# Patient Record
Sex: FEMALE | Race: WHITE
[De-identification: names, ages, dates, MRNs, and addresses within clinical notes are randomized per-mention and may not be internally consistent; named-entity substitution may affect disease eponyms.]

---

## 2018-08-30 ENCOUNTER — HOSPITAL ENCOUNTER (EMERGENCY)
Dept: HOSPITAL 88 - ER | Age: 73
Discharge: HOME | End: 2018-08-30
Payer: MEDICARE

## 2018-08-30 VITALS — SYSTOLIC BLOOD PRESSURE: 128 MMHG | DIASTOLIC BLOOD PRESSURE: 72 MMHG

## 2018-08-30 VITALS — WEIGHT: 170 LBS | HEIGHT: 67 IN | BODY MASS INDEX: 26.68 KG/M2

## 2018-08-30 DIAGNOSIS — R05: ICD-10-CM

## 2018-08-30 DIAGNOSIS — J45.41: ICD-10-CM

## 2018-08-30 DIAGNOSIS — R06.00: Primary | ICD-10-CM

## 2018-08-30 DIAGNOSIS — J98.01: ICD-10-CM

## 2018-08-30 LAB
ALBUMIN SERPL-MCNC: 4 G/DL (ref 3.5–5)
ALBUMIN/GLOB SERPL: 1.3 {RATIO} (ref 0.8–2)
ALP SERPL-CCNC: 52 IU/L (ref 40–150)
ALT SERPL-CCNC: 38 IU/L (ref 0–55)
ANION GAP SERPL CALC-SCNC: 16.2 MMOL/L (ref 8–16)
BASOPHILS # BLD AUTO: 0 10*3/UL (ref 0–0.1)
BASOPHILS NFR BLD AUTO: 0.2 % (ref 0–1)
BUN SERPL-MCNC: 16 MG/DL (ref 7–26)
BUN/CREAT SERPL: 22 (ref 6–25)
CALCIUM SERPL-MCNC: 9.7 MG/DL (ref 8.4–10.2)
CHLORIDE SERPL-SCNC: 105 MMOL/L (ref 98–107)
CK MB SERPL-MCNC: 0.7 NG/ML (ref 0–5)
CK SERPL-CCNC: 34 IU/L (ref 29–168)
CO2 SERPL-SCNC: 25 MMOL/L (ref 22–29)
DEPRECATED APTT PLAS QN: 29 SECONDS (ref 23.8–35.5)
DEPRECATED INR PLAS: 1.09
DEPRECATED NEUTROPHILS # BLD AUTO: 12.6 10*3/UL (ref 2.1–6.9)
EGFRCR SERPLBLD CKD-EPI 2021: > 60 ML/MIN (ref 60–?)
EOSINOPHIL # BLD AUTO: 0 10*3/UL (ref 0–0.4)
EOSINOPHIL NFR BLD AUTO: 0 % (ref 0–6)
ERYTHROCYTE [DISTWIDTH] IN CORD BLOOD: 13.2 % (ref 11.7–14.4)
GLOBULIN PLAS-MCNC: 3 G/DL (ref 2.3–3.5)
GLUCOSE SERPLBLD-MCNC: 121 MG/DL (ref 74–118)
HCT VFR BLD AUTO: 45.2 % (ref 34.2–44.1)
HGB BLD-MCNC: 15.4 G/DL (ref 12–16)
LYMPHOCYTES # BLD: 1.4 10*3/UL (ref 1–3.2)
LYMPHOCYTES NFR BLD AUTO: 9.6 % (ref 18–39.1)
MCH RBC QN AUTO: 30.1 PG (ref 28–32)
MCHC RBC AUTO-ENTMCNC: 34.1 G/DL (ref 31–35)
MCV RBC AUTO: 88.3 FL (ref 81–99)
MONOCYTES # BLD AUTO: 0.6 10*3/UL (ref 0.2–0.8)
MONOCYTES NFR BLD AUTO: 3.9 % (ref 4.4–11.3)
NEUTS SEG NFR BLD AUTO: 85.5 % (ref 38.7–80)
PLATELET # BLD AUTO: 293 X10E3/UL (ref 140–360)
POTASSIUM SERPL-SCNC: 4.2 MMOL/L (ref 3.5–5.1)
PROTHROMBIN TIME: 13.3 SECONDS (ref 11.9–14.5)
RBC # BLD AUTO: 5.12 X10E6/UL (ref 3.6–5.1)
SODIUM SERPL-SCNC: 142 MMOL/L (ref 136–145)

## 2018-08-30 PROCEDURE — 83880 ASSAY OF NATRIURETIC PEPTIDE: CPT

## 2018-08-30 PROCEDURE — 82550 ASSAY OF CK (CPK): CPT

## 2018-08-30 PROCEDURE — 94640 AIRWAY INHALATION TREATMENT: CPT

## 2018-08-30 PROCEDURE — 85730 THROMBOPLASTIN TIME PARTIAL: CPT

## 2018-08-30 PROCEDURE — 87400 INFLUENZA A/B EACH AG IA: CPT

## 2018-08-30 PROCEDURE — 84484 ASSAY OF TROPONIN QUANT: CPT

## 2018-08-30 PROCEDURE — 99284 EMERGENCY DEPT VISIT MOD MDM: CPT

## 2018-08-30 PROCEDURE — 93005 ELECTROCARDIOGRAM TRACING: CPT

## 2018-08-30 PROCEDURE — 85610 PROTHROMBIN TIME: CPT

## 2018-08-30 PROCEDURE — 36415 COLL VENOUS BLD VENIPUNCTURE: CPT

## 2018-08-30 PROCEDURE — 80053 COMPREHEN METABOLIC PANEL: CPT

## 2018-08-30 PROCEDURE — 82553 CREATINE MB FRACTION: CPT

## 2018-08-30 PROCEDURE — 85025 COMPLETE CBC W/AUTO DIFF WBC: CPT

## 2018-08-30 PROCEDURE — 71045 X-RAY EXAM CHEST 1 VIEW: CPT

## 2018-08-30 NOTE — DIAGNOSTIC IMAGING REPORT
EXAMINATION:  CHEST SINGLE (NOT PORTABLE)    



INDICATION:       

\S\CHEST PAIN

\S\[id number]

\S\1954

\S\Y    



COMPARISON:  Chest radiograph 11/25/2017 and 3/19/2017.

     

FINDINGS:  AP view   



TUBES and LINES:  None.



LUNGS:  Lungs are well inflated.  Lungs are clear.   There is no evidence of

pneumonia or pulmonary edema.



PLEURA:  No pleural effusion or pneumothorax.



HEART AND MEDIASTINUM:  The cardiomediastinal silhouette is unremarkable.



BONES AND SOFT TISSUES:  Stable few left-sided rib fractures..  Soft tissues

are unremarkable.



UPPER ABDOMEN: No free air under the diaphragm.    



IMPRESSION: 

No acute cardiopulmonary abnormalities.



Unchanged left-sided rib fractures.





Signed by: Dr. Elodia Hancock M.D. on 8/30/2018 8:19 PM

## 2019-07-04 ENCOUNTER — HOSPITAL ENCOUNTER (EMERGENCY)
Dept: HOSPITAL 88 - ER | Age: 74
Discharge: HOME | End: 2019-07-04
Payer: MEDICARE

## 2019-07-04 VITALS — WEIGHT: 170 LBS | BODY MASS INDEX: 26.68 KG/M2 | HEIGHT: 67 IN

## 2019-07-04 DIAGNOSIS — F41.9: ICD-10-CM

## 2019-07-04 DIAGNOSIS — R10.32: ICD-10-CM

## 2019-07-04 DIAGNOSIS — R19.7: ICD-10-CM

## 2019-07-04 DIAGNOSIS — J44.9: ICD-10-CM

## 2019-07-04 DIAGNOSIS — E78.5: ICD-10-CM

## 2019-07-04 DIAGNOSIS — F17.210: ICD-10-CM

## 2019-07-04 DIAGNOSIS — R10.31: Primary | ICD-10-CM

## 2019-07-04 LAB
ALBUMIN SERPL-MCNC: 4.2 G/DL (ref 3.5–5)
ALBUMIN/GLOB SERPL: 1.6 {RATIO} (ref 0.8–2)
ALP SERPL-CCNC: 53 IU/L (ref 40–150)
ALT SERPL-CCNC: 10 IU/L (ref 0–55)
ANION GAP SERPL CALC-SCNC: 11.9 MMOL/L (ref 8–16)
BACTERIA URNS QL MICRO: (no result) /HPF
BASOPHILS # BLD AUTO: 0.1 10*3/UL (ref 0–0.1)
BASOPHILS NFR BLD AUTO: 0.8 % (ref 0–1)
BILIRUB UR QL: NEGATIVE
BUN SERPL-MCNC: 11 MG/DL (ref 7–26)
BUN/CREAT SERPL: 14 (ref 6–25)
CALCIUM SERPL-MCNC: 9.7 MG/DL (ref 8.4–10.2)
CHLORIDE SERPL-SCNC: 101 MMOL/L (ref 98–107)
CLARITY UR: (no result)
CO2 SERPL-SCNC: 31 MMOL/L (ref 22–29)
COLOR UR: YELLOW
DEPRECATED APTT PLAS QN: 34.3 SECONDS (ref 23.8–35.5)
DEPRECATED INR PLAS: 0.91
DEPRECATED NEUTROPHILS # BLD AUTO: 5.4 10*3/UL (ref 2.1–6.9)
DEPRECATED RBC URNS MANUAL-ACNC: (no result) /HPF (ref 0–5)
EGFRCR SERPLBLD CKD-EPI 2021: > 60 ML/MIN (ref 60–?)
EOSINOPHIL # BLD AUTO: 0.1 10*3/UL (ref 0–0.4)
EOSINOPHIL NFR BLD AUTO: 1.4 % (ref 0–6)
EPI CELLS URNS QL MICRO: (no result) /LPF
ERYTHROCYTE [DISTWIDTH] IN CORD BLOOD: 12.7 % (ref 11.7–14.4)
GLOBULIN PLAS-MCNC: 2.6 G/DL (ref 2.3–3.5)
GLUCOSE SERPLBLD-MCNC: 88 MG/DL (ref 74–118)
HCT VFR BLD AUTO: 40 % (ref 34.2–44.1)
HGB BLD-MCNC: 13.5 G/DL (ref 12–16)
KETONES UR QL STRIP.AUTO: NEGATIVE
LEUKOCYTE ESTERASE UR QL STRIP.AUTO: NEGATIVE
LYMPHOCYTES # BLD: 3.3 10*3/UL (ref 1–3.2)
LYMPHOCYTES NFR BLD AUTO: 33.7 % (ref 18–39.1)
MAGNESIUM SERPL-MCNC: 2.3 MG/DL (ref 1.3–2.1)
MCH RBC QN AUTO: 30 PG (ref 28–32)
MCHC RBC AUTO-ENTMCNC: 33.8 G/DL (ref 31–35)
MCV RBC AUTO: 88.9 FL (ref 81–99)
MONOCYTES # BLD AUTO: 0.8 10*3/UL (ref 0.2–0.8)
MONOCYTES NFR BLD AUTO: 8.5 % (ref 4.4–11.3)
NEUTS SEG NFR BLD AUTO: 55.3 % (ref 38.7–80)
NITRITE UR QL STRIP.AUTO: NEGATIVE
PLATELET # BLD AUTO: 226 X10E3/UL (ref 140–360)
POTASSIUM SERPL-SCNC: 3.9 MMOL/L (ref 3.5–5.1)
PROT UR QL STRIP.AUTO: NEGATIVE
PROTHROMBIN TIME: 12.7 SECONDS (ref 11.9–14.5)
RBC # BLD AUTO: 4.5 X10E6/UL (ref 3.6–5.1)
SODIUM SERPL-SCNC: 140 MMOL/L (ref 136–145)
SP GR UR STRIP: 1.02 (ref 1.01–1.02)
UROBILINOGEN UR STRIP-MCNC: 0.2 MG/DL (ref 0.2–1)
WBC #/AREA URNS HPF: (no result) /HPF (ref 0–5)

## 2019-07-04 PROCEDURE — 74177 CT ABD & PELVIS W/CONTRAST: CPT

## 2019-07-04 PROCEDURE — 83735 ASSAY OF MAGNESIUM: CPT

## 2019-07-04 PROCEDURE — 85730 THROMBOPLASTIN TIME PARTIAL: CPT

## 2019-07-04 PROCEDURE — 36415 COLL VENOUS BLD VENIPUNCTURE: CPT

## 2019-07-04 PROCEDURE — 87086 URINE CULTURE/COLONY COUNT: CPT

## 2019-07-04 PROCEDURE — 80053 COMPREHEN METABOLIC PANEL: CPT

## 2019-07-04 PROCEDURE — 85610 PROTHROMBIN TIME: CPT

## 2019-07-04 PROCEDURE — 81001 URINALYSIS AUTO W/SCOPE: CPT

## 2019-07-04 PROCEDURE — 85025 COMPLETE CBC W/AUTO DIFF WBC: CPT

## 2019-07-04 PROCEDURE — 99284 EMERGENCY DEPT VISIT MOD MDM: CPT

## 2019-07-04 PROCEDURE — 71045 X-RAY EXAM CHEST 1 VIEW: CPT

## 2019-07-04 NOTE — DIAGNOSTIC IMAGING REPORT
EXAMINATION:  CHEST SINGLE (PORTABLE)    



INDICATION:      

^ABD PAIN

^[id number]

^1610



COMPARISON:  8/30/2018

     

FINDINGS:  AP view   



TUBES and LINES:  None.



LUNGS:  Lungs are well inflated.  Lungs are clear.   There is no evidence of

pneumonia or pulmonary edema.



PLEURA:  No pleural effusion or pneumothorax.



HEART AND MEDIASTINUM:  The cardiomediastinal silhouette is unremarkable.    



BONES AND SOFT TISSUES:  No acute osseous lesion.  Soft tissues are

unremarkable.



UPPER ABDOMEN: No free air under the diaphragm.    



IMPRESSION: 

No acute thoracic abnormality.





Signed by: Dr. Gaudencio Erazo MD on 7/4/2019 5:31 PM

## 2019-07-04 NOTE — DIAGNOSTIC IMAGING REPORT
EXAM: CT Abdomen and Pelvis WITH contrast  

INDICATION:      

^LOWER ABD PAIN

^[id number]

^1650 

COMPARISON: None.

TECHNIQUE: Abdomen and pelvis were scanned utilizing a multidetector helical

scanner from the lung base to the pubic symphysis after administration of IV

contrast. Coronal and sagittal reformations were obtained. Dose modulation,

iterative reconstruction, and/or weight based adjustment of the mA/kV was

utilized to reduce the radiation dose to as low as reasonably achievable.

Routine protocol was performed. Scan was performed when during portal venous

phase.    

     IV CONTRAST: 100 mL of Isovue-370

     ORAL CONTRAST: Gastroview

            

COMPLICATIONS: None



RADIATION DOSE:

     Total DLP: 447.94 mGy*cm

     Estimated effective dose: (DLP x 0.015 x size factor) mSv

     CTDIvol has been reviewed. It is below the limits set by the Radiation

Protocol Committee (RPC).



FINDINGS:



LINES and TUBES: None.



LOWER THORAX:  Unremarkable. Minimal bibasilar atelectasis/scarring.



HEPATOBILIARY: No hepatic mass. Hepatomegaly and steatosis. Segment 5

subcentimeter hypodensity is too small to characterize.      No biliary ductal

dilation. 



GALLBLADDER: No radio-opaque stones or sludge.  No wall thickening.



SPLEEN: No splenomegaly. 



PANCREAS: No focal masses or ductal dilatation.  



ADRENALS: No adrenal nodules    



KIDNEYS/URETERS: Kidneys enhance symmetrically.  No hydronephrosis. No cystic

or solid mass lesions.  No stones.



GI TRACT: No abnormal distention, wall thickening, or evidence of bowel

obstruction.       Appendix is not visualized. Small hiatal hernia.



PELVIC ORGANS/BLADDER: Hysterectomy. Bladder is unremarkable.



Peripherally enhancing fat density 2.9 x 2.9 cm mid pelvic lesion with adjacent

inflammation (series 2, image 69).



LYMPH NODES: No lymphadenopathy.



VESSELS: Unremarkable.



PERITONEUM / RETROPERITONEUM: No free air or fluid.



BONES: L5-S1 degenerative changes.



SOFT TISSUES: Unremarkable.            



IMPRESSION: 

Fat density peripherally enhancing pelvic lesion with surrounding mild

inflammation, most likely epiploic appendagitis. Less likely fat necrosis.

Ancillary findings as above.



Signed by: Dr. Gaudencio Erazo MD on 7/4/2019 6:13 PM
